# Patient Record
Sex: MALE
[De-identification: names, ages, dates, MRNs, and addresses within clinical notes are randomized per-mention and may not be internally consistent; named-entity substitution may affect disease eponyms.]

---

## 2018-05-01 ENCOUNTER — HOSPITAL ENCOUNTER (EMERGENCY)
Dept: HOSPITAL 5 - ED | Age: 53
Discharge: HOME | End: 2018-05-01
Payer: SELF-PAY

## 2018-05-01 DIAGNOSIS — Z86.718: ICD-10-CM

## 2018-05-01 DIAGNOSIS — N30.01: ICD-10-CM

## 2018-05-01 DIAGNOSIS — E66.01: ICD-10-CM

## 2018-05-01 DIAGNOSIS — S39.92XA: Primary | ICD-10-CM

## 2018-05-01 DIAGNOSIS — Z88.6: ICD-10-CM
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
## 2018-05-01 DIAGNOSIS — Z86.711: ICD-10-CM

## 2018-05-01 DIAGNOSIS — J44.9: ICD-10-CM

## 2018-05-01 LAB
ALBUMIN SERPL-MCNC: 4 G/DL (ref 3.9–5)
ALT SERPL-CCNC: 18 UNITS/L (ref 7–56)
BASOPHILS # (AUTO): 0.1 K/MM3 (ref 0–0.1)
BASOPHILS NFR BLD AUTO: 1 % (ref 0–1.8)
BILIRUB UR QL STRIP: (no result)
BLOOD UR QL VISUAL: (no result)
BUN SERPL-MCNC: 21 MG/DL (ref 9–20)
BUN/CREAT SERPL: 26 %
CALCIUM SERPL-MCNC: 9.3 MG/DL (ref 8.4–10.2)
EOSINOPHIL # BLD AUTO: 0 K/MM3 (ref 0–0.4)
EOSINOPHIL NFR BLD AUTO: 0.5 % (ref 0–4.3)
HCT VFR BLD CALC: 39.6 % (ref 35.5–45.6)
HEMOLYSIS INDEX: 8
HGB BLD-MCNC: 13.4 GM/DL (ref 11.8–15.2)
LYMPHOCYTES # BLD AUTO: 1 K/MM3 (ref 1.2–5.4)
LYMPHOCYTES NFR BLD AUTO: 12.9 % (ref 13.4–35)
MCH RBC QN AUTO: 31 PG (ref 28–32)
MCHC RBC AUTO-ENTMCNC: 34 % (ref 32–34)
MCV RBC AUTO: 93 FL (ref 84–94)
MONOCYTES # (AUTO): 0.7 K/MM3 (ref 0–0.8)
MONOCYTES % (AUTO): 8.8 % (ref 0–7.3)
MUCOUS THREADS #/AREA URNS HPF: (no result) /HPF
PH UR STRIP: 5 [PH] (ref 5–7)
PLATELET # BLD: 243 K/MM3 (ref 140–440)
RBC # BLD AUTO: 4.27 M/MM3 (ref 3.65–5.03)
RBC #/AREA URNS HPF: > 182 /HPF (ref 0–6)
UROBILINOGEN UR-MCNC: 2 MG/DL (ref ?–2)
WBC #/AREA URNS HPF: 159 /HPF (ref 0–6)

## 2018-05-01 PROCEDURE — 80053 COMPREHEN METABOLIC PANEL: CPT

## 2018-05-01 PROCEDURE — 96375 TX/PRO/DX INJ NEW DRUG ADDON: CPT

## 2018-05-01 PROCEDURE — 82550 ASSAY OF CK (CPK): CPT

## 2018-05-01 PROCEDURE — 93005 ELECTROCARDIOGRAM TRACING: CPT

## 2018-05-01 PROCEDURE — 85025 COMPLETE CBC W/AUTO DIFF WBC: CPT

## 2018-05-01 PROCEDURE — 96365 THER/PROPH/DIAG IV INF INIT: CPT

## 2018-05-01 PROCEDURE — 93010 ELECTROCARDIOGRAM REPORT: CPT

## 2018-05-01 PROCEDURE — 99283 EMERGENCY DEPT VISIT LOW MDM: CPT

## 2018-05-01 PROCEDURE — 36415 COLL VENOUS BLD VENIPUNCTURE: CPT

## 2018-05-01 PROCEDURE — 81001 URINALYSIS AUTO W/SCOPE: CPT

## 2018-05-01 NOTE — EMERGENCY DEPARTMENT REPORT
Blank Doc





- Documentation


Documentation: 





53 y.o c.m is here with back pain, mild 3/10, bright red blood in urine that 

started this morning. no fever, no flank pain, no prior history of such 

symptoms. no chest pain, sob.

## 2018-05-01 NOTE — EMERGENCY DEPARTMENT REPORT
ED Male  HPI





- General


Chief complaint: Urogenital-Male


Stated complaint: BLOOD IN URINE


Time Seen by Provider: 18 16:55


Source: patient, family


Mode of arrival: Stretcher


Limitations: No Limitations





- History of Present Illness


Initial comments: 





53 y.o c.m is here with back pain, mild 3/10, bright red blood in urine that 

started this morning. no fever, no flank pain, no prior history of such 

symptoms. no chest pain, sob.  Pain is located to the lower pack and 5 out of 

10.  Intermittent.  Denies any history of kidney stones.  Patient morbid 

obesity and multiple allergies.  He has a history of COPD, history of 

tracheostomy, sleep apnea, right DVT and PE IVC filter, hernia repair.  Patient 

is lungs are also since  and he's been followed by his primary care doctor 

Talya.  He said he has multiple episode of clots and he is to be on 

medication for life.  Denies any active bleeding in anywhere that in his urine.

  Denies any swelling of legs.  Denies any fever or chills.  Denies any 

coughing.








MD Complaint: other (blood in urine and back pain)


Onset/Timin


-: days(s)


Radiation: none


Severity: moderate


Severity scale (0 -10): 5


Quality: aching


Consistency: intermittent


Improves with: none


Worsens with: none


blood in urine, other (pain is located to the lower back.).  denies: discharge, 

swelling, mass, rash, urinary retention, dysuria, fever, nausea/vomiting, 

incontinence





- Related Data


Sexually active: Yes


 Previous Rx's











 Medication  Instructions  Recorded  Last Taken  Type


 


Levofloxacin [Levaquin] 750 mg PO QDAY 10 Days #10 tablet 18 Unknown Rx


 


Ondansetron [Zofran ODT TAB] 8 mg PO Q8HR PRN #12 tab.rapdis 18 Unknown Rx











 Allergies











Allergy/AdvReac Type Severity Reaction Status Date / Time


 


acetaminophen [From Vicodin] Allergy  Unknown Verified 18 14:38


 


hydrocodone [From Vicodin] Allergy  Unknown Verified 18 14:38


 


ibuprofen [From Motrin] Allergy  Unknown Verified 18 14:38


 


meperidine [From Demerol] Allergy  Unknown Verified 18 14:38


 


morphine Allergy  Unknown Verified 18 14:38


 


oxycodone [From Percocet] Allergy  Unknown Verified 18 14:38


 


promethazine [From Phenergan] Allergy  Unknown Verified 18 14:38


 


propoxyphene Allergy  Unknown Verified 18 14:38





[From Darvocet-N 100]     














ED Review of Systems


ROS: 


Stated complaint: BLOOD IN URINE


Other details as noted in HPI





Comment: All other systems reviewed and negative


Constitutional: no symptoms reported


Respiratory: no symptoms reported


Cardiovascular: denies: chest pain, palpitations, dyspnea on exertion, orthopnea

, edema, syncope, paroxysmal nocturnal dyspnea


Gastrointestinal: denies: abdominal pain, nausea, vomiting, diarrhea, 

constipation, hematemesis, melena, hematochezia


Genitourinary: hematuria.  denies: urgency, dysuria, frequency, discharge, 

testicular pain, testicular mass


Musculoskeletal: back pain.  denies: joint swelling, arthralgia, myalgia


Skin: denies: rash


Neurological: denies: headache


Hematological/Lymphatic: other (patient is on blood thinner)





ED Past Medical Hx





- Past Medical History


Hx Deep Vein Thrombosis: Yes (patient on Xarelto since )


Hx Pulmonary Embolism: Yes


Hx COPD: Yes


Additional medical history: morbid obesity,sleep apnea, right DVT,PE





- Surgical History


Past Surgical History?: Yes


Additional Surgical History: trach,umbilical hernia repair,IVC filter





- Family History


Family history: hypertension





- Social History


Smoking Status: Never Smoker


Substance Use Type: None





- Medications


Home Medications: 


 Home Medications











 Medication  Instructions  Recorded  Confirmed  Last Taken  Type


 


Levofloxacin [Levaquin] 750 mg PO QDAY 10 Days #10 tablet 18  Unknown Rx


 


Ondansetron [Zofran ODT TAB] 8 mg PO Q8HR PRN #12 tab.rapdis 18  Unknown 

Rx














ED Physical Exam





- General


Limitations: No Limitations


General appearance: alert, in no apparent distress, obese (morbid Obesity)





- Head


Head exam: Present: atraumatic, normocephalic, normal inspection





- Eye


Eye exam: Present: normal appearance, PERRL, EOMI.  Absent: scleral icterus, 

conjunctival injection, periorbital swelling, periorbital tenderness


Pupils: Present: normal accommodation





- ENT


ENT exam: Present: normal exam, mucous membranes moist, TM's normal bilaterally

, normal external ear exam





- Neck


Neck exam: Present: normal inspection, full ROM, other (O C-spine tenderness).  

Absent: tenderness, meningismus, lymphadenopathy, thyromegaly





- Respiratory


Respiratory exam: Present: normal lung sounds bilaterally.  Absent: respiratory 

distress, wheezes, rales, rhonchi, stridor, chest wall tenderness, accessory 

muscle use, decreased breath sounds, prolonged expiratory





- Cardiovascular


Cardiovascular Exam: Present: regular rate, normal rhythm, normal heart sounds





- GI/Abdominal


GI/Abdominal exam: Present: soft, normal bowel sounds.  Absent: distended, 

tenderness, guarding, rebound, rigid, mass, bruit, pulsatile mass, hernia





- Extremities Exam


Extremities exam: Present: normal inspection, full ROM, normal capillary refill

, other (no clubbing, cyanosis or edema.  Positive pulses all extremities and 

no neurovascular compromise).  Absent: tenderness, pedal edema, joint swelling, 

calf tenderness





- Back Exam


Back exam: Present: normal inspection, other (ambulates without any difficulties

).  Absent: tenderness, CVA tenderness (R), CVA tenderness (L), muscle spasm, 

paraspinal tenderness, rash noted





- Neurological Exam


Neurological exam: Present: alert, oriented X3, normal gait





- Psychiatric


Psychiatric exam: Present: normal affect, normal mood





- Skin


Skin exam: Present: warm, dry, intact, normal color.  Absent: rash





ED Course


 Vital Signs











  18





  14:32


 


Temperature 97.9 F


 


Pulse Rate 80


 


Respiratory 20





Rate 


 


Blood Pressure 163/75


 


O2 Sat by Pulse 94





Oximetry 














- Reevaluation(s)


Reevaluation #1: 





18 18:38


Patient reports blood in urine and he was given normal saline 1 L, Toradol 30 

mg IV and Zofran 4 mg IV for back pain.  He had one episode of blood in his 

urine. 


Reevaluation #2: 





18 18:38


She could not have CT scan of the abdomen and pelvis without contrast because 

of his morbid obesity.  CT scan could not withstand his weight.





ED Medical Decision Making





- Lab Data


Result diagrams: 


 18 15:55





 18 15:55








 Lab Results











  18 Range/Units





  15:17 15:55 15:55 


 


WBC   7.9   (4.5-11.0)  K/mm3


 


RBC   4.27   (3.65-5.03)  M/mm3


 


Hgb   13.4   (11.8-15.2)  gm/dl


 


Hct   39.6   (35.5-45.6)  %


 


MCV   93   (84-94)  fl


 


MCH   31   (28-32)  pg


 


MCHC   34   (32-34)  %


 


RDW   13.7   (13.2-15.2)  %


 


Plt Count   243   (140-440)  K/mm3


 


Lymph % (Auto)   12.9 L   (13.4-35.0)  %


 


Mono % (Auto)   8.8 H   (0.0-7.3)  %


 


Eos % (Auto)   0.5   (0.0-4.3)  %


 


Baso % (Auto)   1.0   (0.0-1.8)  %


 


Lymph #   1.0 L   (1.2-5.4)  K/mm3


 


Mono #   0.7   (0.0-0.8)  K/mm3


 


Eos #   0.0   (0.0-0.4)  K/mm3


 


Baso #   0.1   (0.0-0.1)  K/mm3


 


Seg Neutrophils %   76.8 H   (40.0-70.0)  %


 


Seg Neutrophils #   6.0   (1.8-7.7)  K/mm3


 


Sodium    138  (137-145)  mmol/L


 


Potassium    4.3  (3.6-5.0)  mmol/L


 


Chloride    101.0  ()  mmol/L


 


Carbon Dioxide    23  (22-30)  mmol/L


 


Anion Gap    18  mmol/L


 


BUN    21 H  (9-20)  mg/dL


 


Creatinine    0.8  (0.8-1.5)  mg/dL


 


Estimated GFR    > 60  ml/min


 


BUN/Creatinine Ratio    26  %


 


Glucose    93  ()  mg/dL


 


Calcium    9.3  (8.4-10.2)  mg/dL


 


Total Bilirubin    0.40  (0.1-1.2)  mg/dL


 


AST    17  (5-40)  units/L


 


ALT    18  (7-56)  units/L


 


Alkaline Phosphatase    72  ()  units/L


 


Total Creatine Kinase     ()  units/L


 


Total Protein    7.2  (6.3-8.2)  g/dL


 


Albumin    4.0  (3.9-5)  g/dL


 


Albumin/Globulin Ratio    1.3  %


 


Urine Color  Red    (Yellow)  


 


Urine Turbidity  Clear    (Clear)  


 


Urine pH  5.0    (5.0-7.0)  


 


Ur Specific Gravity  1.025    (1.003-1.030)  


 


Urine Protein  100 mg/dl    (Negative)  mg/dL


 


Urine Glucose (UA)  Neg    (Negative)  mg/dL


 


Urine Ketones  Tr    (Negative)  mg/dL


 


Urine Blood  Lg    (Negative)  


 


Urine Nitrite  Neg    (Negative)  


 


Urine Bilirubin  Neg    (Negative)  


 


Urine Urobilinogen  2.0    (<2.0)  mg/dL


 


Ur Leukocyte Esterase  Tr    (Negative)  


 


Urine WBC (Auto)  159.0 H    (0.0-6.0)  /HPF


 


Urine RBC (Auto)  > 182.0    (0.0-6.0)  /HPF


 


U Epithel Cells (Auto)  3.0    (0-13.0)  /HPF


 


Urine Mucus  1+    /HPF














  18 Range/Units





  15:55 


 


WBC   (4.5-11.0)  K/mm3


 


RBC   (3.65-5.03)  M/mm3


 


Hgb   (11.8-15.2)  gm/dl


 


Hct   (35.5-45.6)  %


 


MCV   (84-94)  fl


 


MCH   (28-32)  pg


 


MCHC   (32-34)  %


 


RDW   (13.2-15.2)  %


 


Plt Count   (140-440)  K/mm3


 


Lymph % (Auto)   (13.4-35.0)  %


 


Mono % (Auto)   (0.0-7.3)  %


 


Eos % (Auto)   (0.0-4.3)  %


 


Baso % (Auto)   (0.0-1.8)  %


 


Lymph #   (1.2-5.4)  K/mm3


 


Mono #   (0.0-0.8)  K/mm3


 


Eos #   (0.0-0.4)  K/mm3


 


Baso #   (0.0-0.1)  K/mm3


 


Seg Neutrophils %   (40.0-70.0)  %


 


Seg Neutrophils #   (1.8-7.7)  K/mm3


 


Sodium   (137-145)  mmol/L


 


Potassium   (3.6-5.0)  mmol/L


 


Chloride   ()  mmol/L


 


Carbon Dioxide   (22-30)  mmol/L


 


Anion Gap   mmol/L


 


BUN   (9-20)  mg/dL


 


Creatinine   (0.8-1.5)  mg/dL


 


Estimated GFR   ml/min


 


BUN/Creatinine Ratio   %


 


Glucose   ()  mg/dL


 


Calcium   (8.4-10.2)  mg/dL


 


Total Bilirubin   (0.1-1.2)  mg/dL


 


AST   (5-40)  units/L


 


ALT   (7-56)  units/L


 


Alkaline Phosphatase   ()  units/L


 


Total Creatine Kinase  71  ()  units/L


 


Total Protein   (6.3-8.2)  g/dL


 


Albumin   (3.9-5)  g/dL


 


Albumin/Globulin Ratio   %


 


Urine Color   (Yellow)  


 


Urine Turbidity   (Clear)  


 


Urine pH   (5.0-7.0)  


 


Ur Specific Gravity   (1.003-1.030)  


 


Urine Protein   (Negative)  mg/dL


 


Urine Glucose (UA)   (Negative)  mg/dL


 


Urine Ketones   (Negative)  mg/dL


 


Urine Blood   (Negative)  


 


Urine Nitrite   (Negative)  


 


Urine Bilirubin   (Negative)  


 


Urine Urobilinogen   (<2.0)  mg/dL


 


Ur Leukocyte Esterase   (Negative)  


 


Urine WBC (Auto)   (0.0-6.0)  /HPF


 


Urine RBC (Auto)   (0.0-6.0)  /HPF


 


U Epithel Cells (Auto)   (0-13.0)  /HPF


 


Urine Mucus   /HPF








Urine culture sent and pending





- EKG Data


-: EKG Interpreted by Me (attending physician in ER.)


EKG shows normal: sinus rhythm (65 bpm)


Rate: normal





- EKG Data


Interpretation: no acute changes





- Radiology Data


Radiology results: pending





Pt  unable to get CT scan of the abdomen and pelvis due to his massive body 

habitus





- Medical Decision Making





ED course: This is a 53-year-old male here complaining of back pain to his 

lower back and blood in his urine.  Patient health problems been managed by his 

primary care physician was Dr. Babin.  Patient takes around toe for multiple 

episode of DVT and for history of pulmonary embolism.  He has been taking this 

since  without any problems.  Patient has no active bleeding at present but 

his urinalysis showed that he had large amount of blood in his urine, trace 

ketone, leukocyte esterase, white blood cell, red blood cell and bacteria.  

Patient denies any history of kidney stone.  Patient was unable to have CT scan 

of the abdomen and pelvis due to large body habitus.  His CBC stable without 

any abdomen bowel disease and is hemoglobin and hematocrit.  Chemistry is 

stable to include CK. patient was seen by Dr. Gonzalez and evaluated. he was told 

that he will have to follow-up with [facility] to be evaluated for 

genitourinary abnormality and his primary care physician in 1 to 2 days.  I 

discussed with him that if he cannot get in with any of these doctors that he 

will need to return to the emergency room to have his H&H rechecked to make 

sure that he is not actively bleeding.  His H&H is within normal limits at 

present. Dr. Gonzalez involved in patient care.  Patient given IV fluids 1 L in 

the emergency room, Toradol 30 mg IV and Zofran 4 mg IV.  Patient said he was 

allergic to ibuprofen but he is taking Toradol in the past without any 

difficulties.  Patient voiced understanding of discharge diagnosis and 

treatment plan and need to follow up as discussed.  Discharged home with 

prescription for Levaquin and zofran.


Critical care attestation.: 


If time is entered above; I have spent that time in minutes in the direct care 

of this critically ill patient, excluding procedure time.








ED Disposition


Clinical Impression: 


 Acute cystitis with hematuria, Morbid obesity with body mass index of 50.0-

59.9 in adult





Lower back injury


Qualifiers:


 Encounter type: initial encounter Qualified Code(s): S39.92XA - Unspecified 

injury of lower back, initial encounter





Disposition: - TO HOME OR SELFCARE


Is pt being admited?: No


Does the pt Need Aspirin: No


Condition: Stable


Instructions:  Urinary Tract Infection in Men (ED), Acute Hematuria (ED), Back 

Pain (ED), Obesity (ED), Weight Management (ED)


Additional Instructions: 


These follow-up with your primary care physician Dr. Babin in 1-2 days


Follow-up with [facility] in 1-2 days.


If you cannot get in with primary care in [facility] in the morning to 

schedule an appointment and if he cannot get in with any of these physician 

please transfer the hospital in 2 days to have blood level checked to make sure 

that you are not actively bleeding since here on blood thinner and on Xarelto


If you rebleed and increase, please return to the emergency room.  If he does 

not nausea and vomiting, increasing back pain or lower abdominal pain please 

return to emergency room.


Take Levaquin for urinary tract infection and Zofran if he developed nausea.


These increased your fluid intake.


Prescriptions: 


Levofloxacin [Levaquin] 750 mg PO QDAY 10 Days #10 tablet


Ondansetron [Zofran ODT TAB] 8 mg PO Q8HR PRN #12 tab.rapdis


 PRN Reason: Nausea And Vomiting


Referrals: 


[facility] [Primary Care Provider] - 18


[facility] [Provider Group] - 18


Forms:  Work/School Release Form(ED), Accompanied Note

## 2018-05-02 VITALS — DIASTOLIC BLOOD PRESSURE: 86 MMHG | SYSTOLIC BLOOD PRESSURE: 154 MMHG

## 2019-03-15 ENCOUNTER — APPOINTMENT (OUTPATIENT)
Dept: CT IMAGING | Age: 54
End: 2019-03-15
Payer: MEDICAID

## 2019-03-15 ENCOUNTER — HOSPITAL ENCOUNTER (EMERGENCY)
Age: 54
Discharge: HOME | End: 2019-03-15
Attending: EMERGENCY MEDICINE
Payer: MEDICAID

## 2019-03-15 VITALS
RESPIRATION RATE: 11 BRPM | WEIGHT: 315 LBS | HEIGHT: 77 IN | TEMPERATURE: 98.1 F | HEART RATE: 67 BPM | SYSTOLIC BLOOD PRESSURE: 148 MMHG | BODY MASS INDEX: 37.19 KG/M2 | OXYGEN SATURATION: 99 % | DIASTOLIC BLOOD PRESSURE: 69 MMHG

## 2019-03-15 DIAGNOSIS — M79.604 RIGHT LEG PAIN: Primary | ICD-10-CM

## 2019-03-15 LAB
ANION GAP SERPL CALCULATED.3IONS-SCNC: 15 MMOL/L (ref 3–16)
BASE EXCESS VENOUS: 1 (ref -3–3)
BASOPHILS ABSOLUTE: 0 K/UL (ref 0–0.2)
BASOPHILS RELATIVE PERCENT: 0.5 %
BILIRUBIN URINE: ABNORMAL
BLOOD, URINE: ABNORMAL
BUN BLDV-MCNC: 18 MG/DL (ref 7–20)
CALCIUM SERPL-MCNC: 9.1 MG/DL (ref 8.3–10.6)
CHLORIDE BLD-SCNC: 101 MMOL/L (ref 99–110)
CLARITY: ABNORMAL
CO2: 28 MMOL/L (ref 21–32)
COLOR: ABNORMAL
CREAT SERPL-MCNC: 0.9 MG/DL (ref 0.9–1.3)
EKG ATRIAL RATE: 69 BPM
EKG DIAGNOSIS: NORMAL
EKG P AXIS: 44 DEGREES
EKG P-R INTERVAL: 166 MS
EKG Q-T INTERVAL: 404 MS
EKG QRS DURATION: 140 MS
EKG QTC CALCULATION (BAZETT): 432 MS
EKG R AXIS: 71 DEGREES
EKG T AXIS: 69 DEGREES
EKG VENTRICULAR RATE: 69 BPM
EOSINOPHILS ABSOLUTE: 0.1 K/UL (ref 0–0.6)
EOSINOPHILS RELATIVE PERCENT: 1.4 %
EPITHELIAL CELLS, UA: ABNORMAL /HPF
GFR AFRICAN AMERICAN: >60
GFR NON-AFRICAN AMERICAN: >60
GLUCOSE BLD-MCNC: 88 MG/DL (ref 70–99)
GLUCOSE URINE: NEGATIVE MG/DL
HCO3 VENOUS: 27.2 MMOL/L (ref 23–29)
HCT VFR BLD CALC: 39.6 % (ref 40.5–52.5)
HEMOGLOBIN: 13.1 G/DL (ref 13.5–17.5)
KETONES, URINE: NEGATIVE MG/DL
LEUKOCYTE ESTERASE, URINE: NEGATIVE
LYMPHOCYTES ABSOLUTE: 0.8 K/UL (ref 1–5.1)
LYMPHOCYTES RELATIVE PERCENT: 12.4 %
MAGNESIUM: 1.7 MG/DL (ref 1.8–2.4)
MCH RBC QN AUTO: 30.4 PG (ref 26–34)
MCHC RBC AUTO-ENTMCNC: 32.9 G/DL (ref 31–36)
MCV RBC AUTO: 92.4 FL (ref 80–100)
MICROSCOPIC EXAMINATION: YES
MONOCYTES ABSOLUTE: 0.4 K/UL (ref 0–1.3)
MONOCYTES RELATIVE PERCENT: 6.9 %
NEUTROPHILS ABSOLUTE: 4.8 K/UL (ref 1.7–7.7)
NEUTROPHILS RELATIVE PERCENT: 78.8 %
NITRITE, URINE: POSITIVE
O2 SAT, VEN: 34 %
PCO2, VEN: 51.7 MM HG (ref 40–50)
PDW BLD-RTO: 13.8 % (ref 12.4–15.4)
PERFORMED ON: ABNORMAL
PH UA: 7 (ref 5–8)
PH VENOUS: 7.33 (ref 7.35–7.45)
PLATELET # BLD: 272 K/UL (ref 135–450)
PMV BLD AUTO: 7.7 FL (ref 5–10.5)
PO2, VEN: 23 MM HG
POC SAMPLE TYPE: ABNORMAL
POTASSIUM REFLEX MAGNESIUM: 3.4 MMOL/L (ref 3.5–5.1)
PROTEIN UA: 100 MG/DL
RBC # BLD: 4.29 M/UL (ref 4.2–5.9)
RBC UA: >100 /HPF (ref 0–2)
RENAL EPITHELIAL, UA: ABNORMAL /HPF
SODIUM BLD-SCNC: 144 MMOL/L (ref 136–145)
SPECIFIC GRAVITY UA: 1.02 (ref 1–1.03)
TCO2 CALC VENOUS: 29 MMOL/L
TOTAL CK: 53 U/L (ref 39–308)
TROPONIN: <0.01 NG/ML
TROPONIN: <0.01 NG/ML
URINE REFLEX TO CULTURE: YES
URINE TYPE: ABNORMAL
UROBILINOGEN, URINE: 0.2 E.U./DL
WBC # BLD: 6.1 K/UL (ref 4–11)
WBC UA: ABNORMAL /HPF (ref 0–5)

## 2019-03-15 PROCEDURE — 71275 CT ANGIOGRAPHY CHEST: CPT

## 2019-03-15 PROCEDURE — 6360000004 HC RX CONTRAST MEDICATION: Performed by: EMERGENCY MEDICINE

## 2019-03-15 PROCEDURE — 99285 EMERGENCY DEPT VISIT HI MDM: CPT

## 2019-03-15 PROCEDURE — 84484 ASSAY OF TROPONIN QUANT: CPT

## 2019-03-15 PROCEDURE — 87086 URINE CULTURE/COLONY COUNT: CPT

## 2019-03-15 PROCEDURE — 96361 HYDRATE IV INFUSION ADD-ON: CPT

## 2019-03-15 PROCEDURE — 83735 ASSAY OF MAGNESIUM: CPT

## 2019-03-15 PROCEDURE — 6370000000 HC RX 637 (ALT 250 FOR IP): Performed by: EMERGENCY MEDICINE

## 2019-03-15 PROCEDURE — 82803 BLOOD GASES ANY COMBINATION: CPT

## 2019-03-15 PROCEDURE — 96360 HYDRATION IV INFUSION INIT: CPT

## 2019-03-15 PROCEDURE — 85025 COMPLETE CBC W/AUTO DIFF WBC: CPT

## 2019-03-15 PROCEDURE — 82550 ASSAY OF CK (CPK): CPT

## 2019-03-15 PROCEDURE — 81001 URINALYSIS AUTO W/SCOPE: CPT

## 2019-03-15 PROCEDURE — 36415 COLL VENOUS BLD VENIPUNCTURE: CPT

## 2019-03-15 PROCEDURE — 93005 ELECTROCARDIOGRAM TRACING: CPT | Performed by: EMERGENCY MEDICINE

## 2019-03-15 PROCEDURE — 80048 BASIC METABOLIC PNL TOTAL CA: CPT

## 2019-03-15 PROCEDURE — 2580000003 HC RX 258: Performed by: STUDENT IN AN ORGANIZED HEALTH CARE EDUCATION/TRAINING PROGRAM

## 2019-03-15 RX ORDER — BUTALBITAL, ACETAMINOPHEN AND CAFFEINE 50; 325; 40 MG/1; MG/1; MG/1
1 TABLET ORAL EVERY 6 HOURS PRN
COMMUNITY
Start: 2019-02-05

## 2019-03-15 RX ORDER — NITROFURANTOIN 25; 75 MG/1; MG/1
100 CAPSULE ORAL 2 TIMES DAILY
Qty: 10 CAPSULE | Refills: 0 | Status: SHIPPED | OUTPATIENT
Start: 2019-03-15 | End: 2019-03-20

## 2019-03-15 RX ORDER — FUROSEMIDE 20 MG/1
20 TABLET ORAL DAILY
COMMUNITY
Start: 2019-02-05

## 2019-03-15 RX ORDER — PHENTERMINE HYDROCHLORIDE 37.5 MG/1
37.5 TABLET ORAL
COMMUNITY

## 2019-03-15 RX ORDER — BUDESONIDE AND FORMOTEROL FUMARATE DIHYDRATE 160; 4.5 UG/1; UG/1
2 AEROSOL RESPIRATORY (INHALATION) 2 TIMES DAILY
COMMUNITY

## 2019-03-15 RX ORDER — 0.9 % SODIUM CHLORIDE 0.9 %
500 INTRAVENOUS SOLUTION INTRAVENOUS ONCE
Status: COMPLETED | OUTPATIENT
Start: 2019-03-15 | End: 2019-03-15

## 2019-03-15 RX ORDER — PHENYLEPHRINE HCL 10 MG
1 TABLET ORAL DAILY
COMMUNITY

## 2019-03-15 RX ORDER — ACETAMINOPHEN 500 MG
1000 TABLET ORAL ONCE
Status: COMPLETED | OUTPATIENT
Start: 2019-03-15 | End: 2019-03-15

## 2019-03-15 RX ORDER — ALBUTEROL SULFATE 90 UG/1
2 AEROSOL, METERED RESPIRATORY (INHALATION) EVERY 4 HOURS PRN
COMMUNITY
Start: 2013-09-13

## 2019-03-15 RX ORDER — TOPIRAMATE 25 MG/1
25 TABLET ORAL 2 TIMES DAILY
COMMUNITY
Start: 2018-11-02

## 2019-03-15 RX ORDER — FLUOXETINE HYDROCHLORIDE 20 MG/1
20 CAPSULE ORAL DAILY
COMMUNITY
Start: 2019-02-05

## 2019-03-15 RX ORDER — ELECTROLYTES/DEXTROSE
1 SOLUTION, ORAL ORAL DAILY
COMMUNITY

## 2019-03-15 RX ORDER — LISINOPRIL 20 MG/1
20 TABLET ORAL DAILY
COMMUNITY
Start: 2018-12-09

## 2019-03-15 RX ORDER — ALPRAZOLAM 1 MG/1
1 TABLET ORAL DAILY PRN
COMMUNITY

## 2019-03-15 RX ADMIN — SODIUM CHLORIDE 500 ML: 9 INJECTION, SOLUTION INTRAVENOUS at 21:47

## 2019-03-15 RX ADMIN — ACETAMINOPHEN 1000 MG: 500 TABLET, FILM COATED ORAL at 19:48

## 2019-03-15 RX ADMIN — IOPAMIDOL 80 ML: 755 INJECTION, SOLUTION INTRAVENOUS at 20:56

## 2019-03-15 SDOH — HEALTH STABILITY: MENTAL HEALTH: HOW OFTEN DO YOU HAVE A DRINK CONTAINING ALCOHOL?: NEVER

## 2019-03-15 ASSESSMENT — PAIN SCALES - GENERAL
PAINLEVEL_OUTOF10: 8
PAINLEVEL_OUTOF10: 8

## 2019-03-15 ASSESSMENT — PAIN DESCRIPTION - LOCATION: LOCATION: LEG

## 2019-03-15 ASSESSMENT — PAIN DESCRIPTION - PAIN TYPE: TYPE: ACUTE PAIN

## 2019-03-17 LAB — URINE CULTURE, ROUTINE: NORMAL
